# Patient Record
Sex: FEMALE | Race: WHITE | NOT HISPANIC OR LATINO | URBAN - METROPOLITAN AREA
[De-identification: names, ages, dates, MRNs, and addresses within clinical notes are randomized per-mention and may not be internally consistent; named-entity substitution may affect disease eponyms.]

---

## 2022-05-20 ENCOUNTER — ESTABLISHED PATIENT (OUTPATIENT)
Dept: URBAN - METROPOLITAN AREA CLINIC 20 | Facility: CLINIC | Age: 77
End: 2022-05-20

## 2022-05-20 DIAGNOSIS — H11.32: ICD-10-CM

## 2022-05-20 DIAGNOSIS — H04.123: ICD-10-CM

## 2022-05-20 PROCEDURE — 99213 OFFICE O/P EST LOW 20 MIN: CPT

## 2022-05-20 ASSESSMENT — VISUAL ACUITY
OS_SC: 20/20
OD_SC: 20/20

## 2022-05-20 ASSESSMENT — TONOMETRY
OS_IOP_MMHG: 10
OD_IOP_MMHG: 12

## 2022-05-20 NOTE — PATIENT DISCUSSION
mod severity [address] nasally spreading sup and inf with mild-mod elev and clotting. no complications at this time.

## 2022-06-20 ENCOUNTER — ESTABLISHED PATIENT (OUTPATIENT)
Dept: URBAN - METROPOLITAN AREA CLINIC 20 | Facility: CLINIC | Age: 77
End: 2022-06-20

## 2022-06-20 DIAGNOSIS — H35.363: ICD-10-CM

## 2022-06-20 PROCEDURE — 99214 OFFICE O/P EST MOD 30 MIN: CPT

## 2022-06-20 PROCEDURE — 92134 CPTRZ OPH DX IMG PST SGM RTA: CPT

## 2022-06-20 ASSESSMENT — VISUAL ACUITY
OS_SC: 20/20
OU_SC: 20/20
OU_SC: J3
OD_SC: 20/20-1

## 2022-06-20 ASSESSMENT — TONOMETRY
OS_IOP_MMHG: 17
OD_IOP_MMHG: 16

## 2023-07-21 ENCOUNTER — ESTABLISHED PATIENT (OUTPATIENT)
Dept: URBAN - METROPOLITAN AREA CLINIC 20 | Facility: CLINIC | Age: 78
End: 2023-07-21

## 2023-07-21 DIAGNOSIS — H35.363: ICD-10-CM

## 2023-07-21 PROCEDURE — 92014 COMPRE OPH EXAM EST PT 1/>: CPT

## 2023-07-21 ASSESSMENT — TONOMETRY
OS_IOP_MMHG: 16
OD_IOP_MMHG: 15

## 2023-07-21 ASSESSMENT — VISUAL ACUITY
OD_SC: 20/25-2
OS_SC: 20/25-2

## 2024-08-05 ENCOUNTER — FOLLOW UP (OUTPATIENT)
Dept: URBAN - METROPOLITAN AREA CLINIC 20 | Facility: CLINIC | Age: 79
End: 2024-08-05

## 2024-08-05 DIAGNOSIS — H35.363: ICD-10-CM

## 2024-08-05 PROCEDURE — 99214 OFFICE O/P EST MOD 30 MIN: CPT

## 2024-08-05 ASSESSMENT — VISUAL ACUITY
OS_SC: 20/25
OD_SC: 20/25
OU_SC: 20/20

## 2024-08-05 ASSESSMENT — TONOMETRY
OS_IOP_MMHG: 14
OD_IOP_MMHG: 16

## 2025-06-24 ENCOUNTER — COMPREHENSIVE EXAM (OUTPATIENT)
Age: 80
End: 2025-06-24

## 2025-06-24 DIAGNOSIS — H35.363: ICD-10-CM

## 2025-06-24 PROCEDURE — 99214 OFFICE O/P EST MOD 30 MIN: CPT

## 2025-06-24 PROCEDURE — 92134 CPTRZ OPH DX IMG PST SGM RTA: CPT
